# Patient Record
Sex: FEMALE
[De-identification: names, ages, dates, MRNs, and addresses within clinical notes are randomized per-mention and may not be internally consistent; named-entity substitution may affect disease eponyms.]

---

## 2020-05-10 ENCOUNTER — NURSE TRIAGE (OUTPATIENT)
Dept: OTHER | Facility: CLINIC | Age: 80
End: 2020-05-10

## 2020-05-10 NOTE — TELEPHONE ENCOUNTER
Pt states since yesterday she has dark urine, urine is bright red. Denies pain, or  Fevers. Denies flecks or stones. States urine was dark a few days ago but now has turned to bright red. Given disposition recommendation.      Reason for Disposition   Passing pure blood or large blood clots (i.e., size > a dime) (Exception: lesli or small strands)    Protocols used: URINE - BLOOD IN-ADULT-